# Patient Record
Sex: MALE | Race: WHITE | NOT HISPANIC OR LATINO | URBAN - METROPOLITAN AREA
[De-identification: names, ages, dates, MRNs, and addresses within clinical notes are randomized per-mention and may not be internally consistent; named-entity substitution may affect disease eponyms.]

---

## 2024-01-22 ENCOUNTER — APPOINTMENT (OUTPATIENT)
Dept: UROLOGY | Facility: CLINIC | Age: 69
End: 2024-01-22
Payer: COMMERCIAL

## 2024-02-01 ASSESSMENT — ENCOUNTER SYMPTOMS
NAUSEA: 0
ENDOCRINE NEGATIVE: 1
COUGH: 0
FEVER: 0
ALLERGIC/IMMUNOLOGIC NEGATIVE: 1
EYES NEGATIVE: 1
PSYCHIATRIC NEGATIVE: 1
DIFFICULTY URINATING: 0
CHILLS: 0
SHORTNESS OF BREATH: 0

## 2024-02-01 NOTE — PROGRESS NOTES
Subjective   Patient ID: Tony Hyman is a 68 y.o. male.    HPI   Hx of Prostate cancer on Active Surveillance......Trus Bx on 10/28/20 showed Katherine was unchanged at 6(3+3) DX was Originally made in 5/2018. Pt. has been on Active Surveilance. . .  Recent MRI on 7/23 showed PI-RAD 4 with no evidence of METS or extraprostatic extension...Previous PSA done 4/23 and was 8.09...prior PSA was done 11/22 and was 7.59..prior PSA was 7.41. on 11/21..Prior PSA was 5.15 on 7/2020...Prior PSA was 4.87 on 10/2019. Prior PSA was 4.72 on 4/2019. PSA has been as high as 5.9 on 1/2018. Patient also had ONCO testing on biopsy tissue that put him at low risk category..LUTS sx are mild and stable...No frequency and urgency. No dysuria ..No hematuria... Nocturia 1x... Caffeine makes this worse,,No medications for LUT's. ED is not an issue..Pt on Eliquis     Review of Systems   Constitutional:  Negative for chills and fever.   HENT: Negative.     Eyes: Negative.    Respiratory:  Negative for cough and shortness of breath.    Cardiovascular:  Negative for chest pain and leg swelling.   Gastrointestinal:  Negative for nausea.   Endocrine: Negative.    Genitourinary:  Negative for difficulty urinating.        Negative except for documented in HPI   Allergic/Immunologic: Negative.    Neurological:         Alert & oriented X 3   Hematological:         Taking ELIQUIS    Psychiatric/Behavioral: Negative.         Objective   Physical Exam  Vitals and nursing note reviewed.   Constitutional:       General: He is not in acute distress.     Appearance: Normal appearance.   Pulmonary:      Effort: Pulmonary effort is normal.   Abdominal:      Tenderness: There is no abdominal tenderness.   Genitourinary:     Comments: Kidneys non palpable bilaterally  Bladder non palpable or tender  Scrotum no mass, No hydrocele  Epididymis- No spermatocele. Non Tender.  Testicles: No mass. ENL  Urethra: No discharge  Penis within normal limits... No lesions.  CIrcumcised  Prostate - symmetric, no nodules. BEnign  Seminal Vesicals: No mass.  Sphincter tone: normal  Neurological:      Mental Status: He is alert.         Assessment/Plan       Diagnoses and all orders for this visit:  Malignant neoplasm of prostate (CMS/HCC)  Elevated PSA  Erectile dysfunction, unspecified erectile dysfunction type  Nocturia    All available PSA values reviewed, Options discussed. Questions answered.  Past Path reviewed  Pros/cons of Active surveillance discussed  MRI reviewed   Diet changes for prostate health discussed and educational information given. Pros/Cons of prostate health supplements discussed.   Treatment options for LUTS reviewed  Discussed timed voiding. Discussed fluid and caffeine intake  Treatment options for ED reviewed.  Lifestyle change to help prevent UTIs discussed. Encouraged fluid intake.    F/U with PSA Virtual

## 2024-02-05 ENCOUNTER — OFFICE VISIT (OUTPATIENT)
Dept: UROLOGY | Facility: CLINIC | Age: 69
End: 2024-02-05
Payer: COMMERCIAL

## 2024-02-05 VITALS — BODY MASS INDEX: 26.58 KG/M2 | RESPIRATION RATE: 16 BRPM | WEIGHT: 207 LBS

## 2024-02-05 DIAGNOSIS — C61 MALIGNANT NEOPLASM OF PROSTATE (MULTI): ICD-10-CM

## 2024-02-05 DIAGNOSIS — R35.1 NOCTURIA: ICD-10-CM

## 2024-02-05 DIAGNOSIS — N52.9 ERECTILE DYSFUNCTION, UNSPECIFIED ERECTILE DYSFUNCTION TYPE: ICD-10-CM

## 2024-02-05 DIAGNOSIS — R97.20 ELEVATED PSA: ICD-10-CM

## 2024-02-05 PROCEDURE — 99214 OFFICE O/P EST MOD 30 MIN: CPT | Performed by: UROLOGY

## 2024-02-05 PROCEDURE — 1159F MED LIST DOCD IN RCRD: CPT | Performed by: UROLOGY

## 2024-02-05 PROCEDURE — 1036F TOBACCO NON-USER: CPT | Performed by: UROLOGY

## 2024-02-05 RX ORDER — LOSARTAN POTASSIUM 100 MG/1
100 TABLET ORAL
COMMUNITY
Start: 2024-01-25

## 2024-08-02 ASSESSMENT — ENCOUNTER SYMPTOMS
PSYCHIATRIC NEGATIVE: 1
EYES NEGATIVE: 1
SHORTNESS OF BREATH: 0
DIFFICULTY URINATING: 0
NAUSEA: 0
FEVER: 0
CHILLS: 0
ENDOCRINE NEGATIVE: 1
ALLERGIC/IMMUNOLOGIC NEGATIVE: 1
COUGH: 0

## 2024-08-02 NOTE — PROGRESS NOTES
Subjective   Patient ID: Tony Hyman is a 68 y.o. male.    HPI  Hx of Prostate cancer on Active Surveillance......Trus Bx on 10/28/20 showed Elnora was unchanged at 6(3+3) DX was Originally made in 5/2018. Pt. has been on Active Surveilance. . .  Recent MRI on 7/23 showed PI-RAD 4 with no evidence of METS or extraprostatic extension...Most recent PSA was 8.67 on 5/24. Previous PSA done 4/23 and was 8.09...prior PSA was done 11/22 and was 7.59..prior PSA was 7.41. on 11/21..Prior PSA was 5.15 on 7/2020...Prior PSA was 4.87 on 10/2019. Prior PSA was 4.72 on 4/2019. PSA has been as high as 5.9 on 1/2018. Patient also had ONCO testing on biopsy tissue that put him at low risk category..LUTS sx are mild and stable...No frequency and urgency. No dysuria ..No hematuria... Nocturia 1x... Caffeine makes this worse,,No medications for LUT's. ED is not an issue..Pt on Eliquis       Review of Systems   Constitutional:  Negative for chills and fever.   HENT: Negative.     Eyes: Negative.    Respiratory:  Negative for cough and shortness of breath.    Cardiovascular:  Negative for chest pain and leg swelling.   Gastrointestinal:  Negative for nausea.   Endocrine: Negative.    Genitourinary:  Negative for difficulty urinating.        Negative except for documented in HPI   Allergic/Immunologic: Negative.    Neurological:         Alert & oriented X 3   Hematological:         Eliquis    Psychiatric/Behavioral: Negative.         Objective   Physical Exam  Vitals and nursing note reviewed.   Constitutional:       General: He is not in acute distress.     Appearance: Normal appearance.   Pulmonary:      Effort: Pulmonary effort is normal.   Abdominal:      Tenderness: There is no abdominal tenderness.   Genitourinary:     Comments: Kidneys non palpable bilaterally  Bladder non palpable or tender  Scrotum no mass, No hydrocele  Epididymis- No spermatocele. Non Tender.  Testicles: No mass  Urethra: No discharge  Penis within normal  limits... No lesions  Prostate - slightly Asymmetric, subtle nodule Right periphery  Seminal Vesicals: No mass.  Sphincter tone: normal  Neurological:      Mental Status: He is alert.       Assessment/Plan   Diagnoses and all orders for this visit:  Nocturia  Erectile dysfunction, unspecified erectile dysfunction type  Elevated PSA  Malignant neoplasm of prostate (Multi)      All available PSA values reviewed, Options discussed. Questions answered.  Past MRI reviewed-Discusssed Repeat MRI  Pros and cons of prostate biopsy reviewed. Other options discussed. Questions answered   Diet changes for prostate health discussed and educational information given. Pros/Cons of prostate health supplements discussed.   Theralogix given  Treatment options for LUTS reviewed  Discussed timed voiding. Discussed fluid and caffeine intake  Treatment options for ED reviewed.  Lifestyle change to help prevent UTIs discussed. Encouraged fluid intake.    F/U 6 months with PSA

## 2024-08-05 ENCOUNTER — APPOINTMENT (OUTPATIENT)
Dept: UROLOGY | Facility: CLINIC | Age: 69
End: 2024-08-05
Payer: COMMERCIAL

## 2024-08-05 VITALS
DIASTOLIC BLOOD PRESSURE: 78 MMHG | BODY MASS INDEX: 25.18 KG/M2 | HEIGHT: 74 IN | WEIGHT: 196.2 LBS | HEART RATE: 76 BPM | SYSTOLIC BLOOD PRESSURE: 121 MMHG

## 2024-08-05 DIAGNOSIS — C61 MALIGNANT NEOPLASM OF PROSTATE (MULTI): ICD-10-CM

## 2024-08-05 DIAGNOSIS — N52.9 ERECTILE DYSFUNCTION, UNSPECIFIED ERECTILE DYSFUNCTION TYPE: ICD-10-CM

## 2024-08-05 DIAGNOSIS — R97.20 ELEVATED PSA: ICD-10-CM

## 2024-08-05 DIAGNOSIS — R35.1 NOCTURIA: ICD-10-CM

## 2024-08-05 PROCEDURE — 99214 OFFICE O/P EST MOD 30 MIN: CPT | Performed by: UROLOGY

## 2024-08-05 PROCEDURE — 1159F MED LIST DOCD IN RCRD: CPT | Performed by: UROLOGY

## 2024-08-05 PROCEDURE — 1036F TOBACCO NON-USER: CPT | Performed by: UROLOGY

## 2024-08-05 PROCEDURE — 3008F BODY MASS INDEX DOCD: CPT | Performed by: UROLOGY

## 2025-02-01 LAB — PSA SERPL-MCNC: 6.3 NG/ML

## 2025-02-03 ENCOUNTER — APPOINTMENT (OUTPATIENT)
Dept: UROLOGY | Facility: CLINIC | Age: 70
End: 2025-02-03
Payer: COMMERCIAL

## 2025-02-20 ASSESSMENT — ENCOUNTER SYMPTOMS
CHILLS: 0
SHORTNESS OF BREATH: 0
NAUSEA: 0
COUGH: 0
FEVER: 0
ENDOCRINE NEGATIVE: 1
PSYCHIATRIC NEGATIVE: 1
DIFFICULTY URINATING: 0
EYES NEGATIVE: 1
ALLERGIC/IMMUNOLOGIC NEGATIVE: 1

## 2025-02-20 NOTE — PROGRESS NOTES
Subjective   Patient ID: Tony Hyman is a 69 y.o. male.    HPI  Hx of Prostate cancer on Active Surveillance......Trus Bx on 10/28/20 showed Valera was unchanged at 6(3+3) DX was Originally made in 5/2018. Pt. has been on Active Surveilance. . .  Recent MRI on 7/23 showed PI-RAD 4 with no evidence of METS or extraprostatic extension...Most recent PSA was 6.30 on 1/25. Prior PSA was 8.67 on 5/24. Previous PSA done 4/23 and was 8.09...prior PSA was done 11/22 and was 7.59..prior PSA was 7.41. on 11/21..Prior PSA was 5.15 on 7/2020...Prior PSA was 4.87 on 10/2019. Prior PSA was 4.72 on 4/2019. PSA has been as high as 5.9 on 1/2018. Patient also had ONCO testing on biopsy tissue that put him at low risk category..LUTS sx are mild and stable...No frequency and urgency. No dysuria ..No hematuria... Nocturia 1x... Caffeine makes this worse,,No medications for LUT's. ED is not an issue..Pt on Eliquis       Review of Systems   Constitutional:  Negative for chills and fever.   HENT: Negative.     Eyes: Negative.    Respiratory:  Negative for cough and shortness of breath.    Cardiovascular:  Negative for chest pain and leg swelling.   Gastrointestinal:  Negative for nausea.   Endocrine: Negative.    Genitourinary:  Negative for difficulty urinating.        Negative except for documented in HPI   Allergic/Immunologic: Negative.    Neurological:         Alert & oriented X 3   Hematological:         Denies blood thinners   Psychiatric/Behavioral: Negative.         Objective   Physical Exam  No PE done given the virtual nature of visit.   Assessment/Plan   There are no diagnoses linked to this encounter.       virtual nature of visit.   Assessment/Plan   Diagnoses and all orders for this visit:  Malignant neoplasm of prostate (Multi)  Elevated PSA  Erectile dysfunction, unspecified erectile dysfunction type  Nocturia    All available PSA values reviewed, Options discussed. Questions answered.  Past 2 Bx results reviewed  Pros and cons of third REPEAT prostate biopsy reviewed. Other options discussed. Questions answered  Past MRIs reviewed  New MRI and PSA ordered ath folllow-up  Diet changes for prostate health discussed and educational information given. Pros/Cons of prostate health supplements discussed.   Treatment options for LUTS reviewed-not bothersome  Discussed timed voiding. Discussed fluid and caffeine intake  Treatment options for ED reviewed-observe  Lifestyle change to help prevent UTIs discussed. Encouraged fluid intake.  BMP reviewed  F/U 6 months with PSA and MRI

## 2025-02-25 ENCOUNTER — APPOINTMENT (OUTPATIENT)
Dept: UROLOGY | Facility: CLINIC | Age: 70
End: 2025-02-25
Payer: COMMERCIAL

## 2025-02-25 DIAGNOSIS — R35.1 NOCTURIA: ICD-10-CM

## 2025-02-25 DIAGNOSIS — C61 MALIGNANT NEOPLASM OF PROSTATE (MULTI): ICD-10-CM

## 2025-02-25 DIAGNOSIS — N52.9 ERECTILE DYSFUNCTION, UNSPECIFIED ERECTILE DYSFUNCTION TYPE: ICD-10-CM

## 2025-02-25 DIAGNOSIS — R97.20 ELEVATED PSA: ICD-10-CM

## 2025-02-25 PROCEDURE — 99213 OFFICE O/P EST LOW 20 MIN: CPT | Performed by: UROLOGY

## 2025-02-25 PROCEDURE — 1159F MED LIST DOCD IN RCRD: CPT | Performed by: UROLOGY

## 2025-02-25 PROCEDURE — 1036F TOBACCO NON-USER: CPT | Performed by: UROLOGY

## 2025-03-06 ENCOUNTER — APPOINTMENT (OUTPATIENT)
Dept: UROLOGY | Facility: CLINIC | Age: 70
End: 2025-03-06
Payer: COMMERCIAL

## 2025-07-14 ENCOUNTER — APPOINTMENT (OUTPATIENT)
Dept: UROLOGY | Facility: CLINIC | Age: 70
End: 2025-07-14
Payer: COMMERCIAL

## 2025-07-15 ENCOUNTER — APPOINTMENT (OUTPATIENT)
Dept: UROLOGY | Facility: CLINIC | Age: 70
End: 2025-07-15
Payer: COMMERCIAL

## 2025-07-16 DIAGNOSIS — R97.20 ELEVATED PSA: ICD-10-CM

## 2025-07-17 ENCOUNTER — APPOINTMENT (OUTPATIENT)
Dept: UROLOGY | Facility: CLINIC | Age: 70
End: 2025-07-17
Payer: COMMERCIAL

## 2025-07-17 DIAGNOSIS — N52.9 ERECTILE DYSFUNCTION, UNSPECIFIED ERECTILE DYSFUNCTION TYPE: ICD-10-CM

## 2025-07-17 DIAGNOSIS — R97.20 ELEVATED PSA: ICD-10-CM

## 2025-07-17 DIAGNOSIS — C61 MALIGNANT NEOPLASM OF PROSTATE (MULTI): ICD-10-CM

## 2025-07-17 DIAGNOSIS — R35.1 NOCTURIA: ICD-10-CM

## 2025-07-17 LAB — PSA SERPL-MCNC: 7.31 NG/ML

## 2025-07-17 PROCEDURE — 99213 OFFICE O/P EST LOW 20 MIN: CPT | Performed by: UROLOGY

## 2025-07-17 PROCEDURE — G2211 COMPLEX E/M VISIT ADD ON: HCPCS | Performed by: UROLOGY

## 2025-07-17 ASSESSMENT — ENCOUNTER SYMPTOMS
DIFFICULTY URINATING: 0
COUGH: 0
CHILLS: 0
ENDOCRINE NEGATIVE: 1
NAUSEA: 0
ALLERGIC/IMMUNOLOGIC NEGATIVE: 1
EYES NEGATIVE: 1
PSYCHIATRIC NEGATIVE: 1
SHORTNESS OF BREATH: 0
FEVER: 0

## 2025-07-17 NOTE — PROGRESS NOTES
Virtual or Telephone Consent    While technically available, the patient was unable or unwilling to consent to connect via audio/video telehealth technology; therefore, I performed this visit using a real-time audio only connection between Tony Hyman & Castillo Cm MD.  Verbal consent was requested and obtained from Tony Hyman on this date, 07/17/25 for a telehealth visit and the patient's location was confirmed at the time of the visit.      Subjective   Patient ID: Tony Hyman is a 69 y.o. male.    HPI Hx of Prostate cancer on Active Surveillance......Trus Bx on 10/28/20 showed Katherine was unchanged at 6(3+3) DX was Originally made in 5/2018. Pt. has been on Active Surveilance since Dx. . .  Recent MRI on 7/258 showed PI-RADS 4 lesion and 2 PI-RAD 3 Lesions.. Previous Prostate MRI showed 7/23 showed PI-RAD 4 with no evidence of METS or extraprostatic extension...Most recent PSA was 7.31 (7/25) previous PSA was  6.30 on 1/25. Prior PSA was 8.67 on 5/24. Previous PSA done 4/23 and was 8.09...prior PSA was done 11/22 and was 7.59..prior PSA was 7.41. on 11/21..Prior PSA was 5.15 on 7/2020...Prior PSA was 4.87 on 10/2019. Prior PSA was 4.72 on 4/2019. PSA has been as high as 5.9 on 1/2018. Patient also had ONCO testing on biopsy tissue that put him at low risk category..LUTS sx are mild and stable...No frequency and urgency. No dysuria ..No hematuria... Nocturia 1x... Caffeine makes this worse,,No medications for LUT's. ED is not an issue.. 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   HENT: Negative.     Eyes: Negative.    Respiratory:  Negative for cough and shortness of breath.    Cardiovascular:  Negative for chest pain and leg swelling.   Gastrointestinal:  Negative for nausea.   Endocrine: Negative.    Genitourinary:  Negative for difficulty urinating.        Negative except for documented in HPI    Allergic/Immunologic: Negative.    Neurological:         Alert & oriented X 3   Hematological:         COUMADIN   Psychiatric/Behavioral: Negative.         Objective   Physical Exam  No PE done given the virtual nature of visit.     Assessment/Plan   Diagnoses and all orders for this visit:  Elevated PSA  Erectile dysfunction, unspecified erectile dysfunction type  Nocturia  Malignant neoplasm of prostate (Multi)    All available PSA values reviewed, Options discussed. Questions answered.  MRI reviewed-Past and present. Discussed fondings and slight increase in size  Pros/cons of Active Surveillance discussed and patient and family wish to continue  Pros and cons of REPEAT prostate biopsy reviewed to compare pathology specimens. Other options discussed. Questions answered-Patient sees Dr TAMEZ and wife works for her and she agrees that Active Survellance is reasonable   Diet changes for prostate health discussed and educational information given. Pros/Cons of prostate health supplements discussed.   Treatment options for LUTS reviewed-Not bothersome  Treatment options for ED reviewed-Not an issue      F/U 6 months with PSA

## 2025-07-20 DIAGNOSIS — R97.20 ELEVATED PSA: ICD-10-CM

## 2025-07-20 DIAGNOSIS — C61 MALIGNANT NEOPLASM OF PROSTATE (MULTI): ICD-10-CM
